# Patient Record
(demographics unavailable — no encounter records)

---

## 2025-01-16 NOTE — DATA REVIEWED
[de-identified] : XR left ankle 3 views from [facility] on 1/14/25 reviewed: no acute fracture or dislocation. Skeletally immature

## 2025-01-16 NOTE — ASSESSMENT
[FreeTextEntry1] : Madyson is a 6Y female with left CFL sprain and Salter-Osorio type I distal fibula fracture left ankle sustained 1/14/25 Today's visit included obtaining history from the parent due to the child's age, the child could not be considered a reliable historian, requiring parent to act as independent historian  Clinical findings and imaging discussed at length with parents. Recommendation at this time would be CAM boot for school only. She can be WBAT in the CAM boot. She can work on gentle ankle range of motion at home. Rest, ice and elevation. NSAIDs as needed for pain relief. Avoid gym, sports and recess. She will f/u in 3-4 weeks for repeat clinical evaluation and XR left ankle. All questions answered. Family and patient verbalize understanding of the plan.   Marissa CHANG PA-C have acted as scribe and documented the above for Dr. Carrera

## 2025-01-16 NOTE — END OF VISIT
[FreeTextEntry3] :     Saw and examined patient; the above is an accurate documentation of my words and actions.   Karo Carrera MD [facility] Pediatric Orthopedic Surgery

## 2025-01-16 NOTE — PHYSICAL EXAM
[FreeTextEntry1] : Gait: Presents NWB LLE in a short leg splint  GENERAL: alert, cooperative, in NAD SKIN: The skin is intact, warm, pink and dry over the area examined. EYES: Normal conjunctiva, normal eyelids and pupils were equal and round. ENT: normal ears, normal nose and normal lips. CARDIOVASCULAR: brisk capillary refill, but no peripheral edema. RESPIRATORY: The patient is in no apparent respiratory distress. They're taking full deep breaths without use of accessory muscles or evidence of audible wheezes or stridor without the use of a stethoscope. Normal respiratory effort. ABDOMEN: not examined  Focused exam left ankle Splint removed for examination Skin is intact and there is no breakdown or abrasion There is soft tissue swelling and ecchymosis lateral aspect of the ankle There is tenderness about the CFL and distal fibula Brisk capillary refill distally NV intact

## 2025-01-16 NOTE — HISTORY OF PRESENT ILLNESS
[FreeTextEntry1] : Madyson is a 6Y female who presents with her parents for evaluation of left ankle injury sustained 1/14/25. She was at school playing when she twisted her left ankle. She immediately felt pain and had inability to bear weight on the LLE. She was seen at [facility] where she had XRs done which were unremarkable for any fractures. She was placed in a short leg splint and referred to see peds ortho. She has been tolerating her splint well without any issues. She has been taking Tylenol for pain with relief. Here for orthopedic evaluation and management.   Of note, parents speak Slovak primarily and hence the  was used for an entire visit

## 2025-01-16 NOTE — REASON FOR VISIT
[Initial Evaluation] : an initial evaluation [Parents] : parents [FreeTextEntry1] : left ankle injury. DOI-1/14/25

## 2025-02-10 NOTE — END OF VISIT
[FreeTextEntry3] :     Saw and examined patient; the above is an accurate documentation of my words and actions.   Karo Carrera MD [facility] Pediatric Orthopedic Surgery

## 2025-02-10 NOTE — DATA REVIEWED
[de-identified] : XR left ankle 3 views performed today 2/10/25: no acute fracture or dislocation. Skeletally immature

## 2025-02-10 NOTE — PHYSICAL EXAM
[FreeTextEntry1] : Gait: Presents WBAT in the CAM boot  GENERAL: alert, cooperative, in NAD SKIN: The skin is intact, warm, pink and dry over the area examined. EYES: Normal conjunctiva, normal eyelids and pupils were equal and round. ENT: normal ears, normal nose and normal lips. CARDIOVASCULAR: brisk capillary refill, but no peripheral edema. RESPIRATORY: The patient is in no apparent respiratory distress. They're taking full deep breaths without use of accessory muscles or evidence of audible wheezes or stridor without the use of a stethoscope. Normal respiratory effort. ABDOMEN: not examined  Focused exam left ankle CAM boot removed for examination Skin is intact and there is no breakdown or abrasion Fully resolved swelling and ecchymosis  There is no tenderness about the CFL and distal fibula Brisk capillary refill distally NV intact

## 2025-02-10 NOTE — REVIEW OF SYSTEMS
[Change in Activity] : change in activity [Nl] : Musculoskeletal [Limping] : no limping [Joint Pains] : no arthralgias [Joint Swelling] : no joint swelling

## 2025-02-10 NOTE — ASSESSMENT
[FreeTextEntry1] : Madyson is a 6Y female with left CFL sprain and Salter-Osorio type I distal fibula fracture left ankle sustained 1/14/25 Today's visit included obtaining history from the parent due to the child's age, the child could not be considered a reliable historian, requiring parent to act as independent historian  Clinical findings and imaging discussed at length with parents. She is doing well overall. XRs left ankle performed today reviewed revealing no acute fracture. At this time, she can discontinue the CAM boot and transition to regular sneaker. After 3 weeks, she can resume full activities without any restrictions. She will f/u on prn basis. All questions answered. Family and patient verbalize understanding of the plan.   IMarissa PA-C have acted as scribe and documented the above for Dr. Carrera